# Patient Record
Sex: FEMALE | URBAN - METROPOLITAN AREA
[De-identification: names, ages, dates, MRNs, and addresses within clinical notes are randomized per-mention and may not be internally consistent; named-entity substitution may affect disease eponyms.]

---

## 2019-03-13 ENCOUNTER — APPOINTMENT (RX ONLY)
Dept: URBAN - METROPOLITAN AREA CLINIC 158 | Facility: CLINIC | Age: 69
Setting detail: DERMATOLOGY
End: 2019-03-13

## 2019-03-13 DIAGNOSIS — L20.89 OTHER ATOPIC DERMATITIS: ICD-10-CM | Status: INADEQUATELY CONTROLLED

## 2019-03-13 DIAGNOSIS — L21.8 OTHER SEBORRHEIC DERMATITIS: ICD-10-CM

## 2019-03-13 DIAGNOSIS — L30.4 ERYTHEMA INTERTRIGO: ICD-10-CM

## 2019-03-13 PROBLEM — M12.9 ARTHROPATHY, UNSPECIFIED: Status: ACTIVE | Noted: 2019-03-13

## 2019-03-13 PROBLEM — L20.84 INTRINSIC (ALLERGIC) ECZEMA: Status: ACTIVE | Noted: 2019-03-13

## 2019-03-13 PROBLEM — L55.1 SUNBURN OF SECOND DEGREE: Status: ACTIVE | Noted: 2019-03-13

## 2019-03-13 PROBLEM — L29.8 OTHER PRURITUS: Status: ACTIVE | Noted: 2019-03-13

## 2019-03-13 PROBLEM — L23.7 ALLERGIC CONTACT DERMATITIS DUE TO PLANTS, EXCEPT FOOD: Status: ACTIVE | Noted: 2019-03-13

## 2019-03-13 PROBLEM — L85.3 XEROSIS CUTIS: Status: ACTIVE | Noted: 2019-03-13

## 2019-03-13 PROCEDURE — ? TREATMENT REGIMEN

## 2019-03-13 PROCEDURE — ? COUNSELING

## 2019-03-13 PROCEDURE — 99242 OFF/OP CONSLTJ NEW/EST SF 20: CPT

## 2019-03-13 PROCEDURE — ? PRESCRIPTION

## 2019-03-13 RX ORDER — CLOBETASOL PROPIONATE 0.46 MG/ML
APPLY SOLUTION TOPICAL QHS
Qty: 50 | Refills: 5 | Status: ERX | COMMUNITY
Start: 2019-03-13

## 2019-03-13 RX ORDER — FLUOCINOLONE ACETONIDE 0.25 MG/G
APPLY CREAM TOPICAL BID
Qty: 60 | Refills: 5 | Status: ERX | COMMUNITY
Start: 2019-03-13

## 2019-03-13 RX ORDER — KETOCONAZOLE 20.5 MG/ML
APPLY SHAMPOO, SUSPENSION TOPICAL
Qty: 120 | Refills: 5 | Status: ERX | COMMUNITY
Start: 2019-03-13

## 2019-03-13 RX ADMIN — FLUOCINOLONE ACETONIDE APPLY: 0.25 CREAM TOPICAL at 19:10

## 2019-03-13 RX ADMIN — CLOBETASOL PROPIONATE APPLY: 0.46 SOLUTION TOPICAL at 19:09

## 2019-03-13 RX ADMIN — KETOCONAZOLE APPLY: 20.5 SHAMPOO, SUSPENSION TOPICAL at 19:09

## 2019-03-13 ASSESSMENT — LOCATION DETAILED DESCRIPTION DERM
LOCATION DETAILED: MID POSTERIOR NECK
LOCATION DETAILED: SUBXIPHOID
LOCATION DETAILED: LEFT MEDIAL FRONTAL SCALP
LOCATION DETAILED: RIGHT SUPERIOR MEDIAL UPPER BACK
LOCATION DETAILED: MID-OCCIPITAL SCALP

## 2019-03-13 ASSESSMENT — LOCATION SIMPLE DESCRIPTION DERM
LOCATION SIMPLE: LEFT SCALP
LOCATION SIMPLE: POSTERIOR NECK
LOCATION SIMPLE: RIGHT UPPER BACK
LOCATION SIMPLE: POSTERIOR SCALP
LOCATION SIMPLE: ABDOMEN

## 2019-03-13 ASSESSMENT — LOCATION ZONE DERM
LOCATION ZONE: TRUNK
LOCATION ZONE: SCALP
LOCATION ZONE: NECK

## 2019-03-13 ASSESSMENT — BSA RASH: BSA RASH: 5

## 2019-03-13 ASSESSMENT — SEVERITY ASSESSMENT: SEVERITY: MILD

## 2019-03-13 NOTE — PROCEDURE: TREATMENT REGIMEN
Initiate Treatment: Clobetasol solution QHS, Ketoconazole shampoo daily, fluocinolone cream bid for other areas
Initiate Treatment: Fluocinolone cream bid
Detail Level: Zone
Initiate Treatment: Fluocinolone cream bid prn
Discontinue Regimen: Mometasone cream